# Patient Record
(demographics unavailable — no encounter records)

---

## 2017-05-31 NOTE — ED
Upper Extremity HPI





- General


Chief Complaint: Extremity Injury, Upper


Stated Complaint: Shoulder Pain/Arm Numb


Time Seen by Provider: 05/31/17 00:11


Source: patient, RN notes reviewed


Mode of arrival: wheelchair


Limitations: no limitations





- History of Present Illness


Initial Comments: 





53-year-old male presents to the emergency department with a chief complaint of 

bilateral shoulder pain and numbness and tingling down the left arm.  Patient 

has been having the symptoms for about a month ever since he had an injury at 

work.  Patient states that he went orthopedics and he is scheduled to have an 

MRI on Friday.  Patient states he just continued to has the discomfort he tried 

narcotic was at home with no improvement so he thought maybe we could help him.

  He is hoping that we can get in the arm so that doesn't hurt anymore.  

Patient denies any new falls or injuries.  Patient states that these symptoms 

are much like the symptoms he had when he fell and now actually is hand 

movement has improved since he seen or felt.  Patient states he was hoping that 

we can just take his pain away and that he could get some sleep.Patient denies 

any recent fever, chills, shortness of breath, chest pain, back pain, abdominal 

pain, nausea vomiting, numbness or tingling, dysuria or hematuria, constipation 

or diarrhea, headaches or visual changes, or any other current symptoms.


Place: home





- Related Data


 Home Medications











 Medication  Instructions  Recorded  Confirmed


 


Metoprolol Succinate (ER) [Toprol 100 mg PO DAILY 05/31/17 05/31/17





Xl]   


 


Pravastatin Sodium [Pravachol] 10 mg PO DAILY 05/31/17 05/31/17








 Previous Rx's











 Medication  Instructions  Recorded


 


Orphenadrine [Norflex] 100 mg PO Q12H #10 tablet.er 05/31/17


 


predniSONE 50 mg PO DAILY #5 tab 05/31/17











 Allergies











Allergy/AdvReac Type Severity Reaction Status Date / Time


 


No Known Allergies Allergy   Verified 05/31/17 00:03














Review of Systems


ROS Statement: 


Those systems with pertinent positive or pertinent negative responses have been 

documented in the HPI.





ROS Other: All systems not noted in ROS Statement are negative.





Past Medical History


Past Medical History: Hypertension


History of Any Multi-Drug Resistant Organisms: None Reported


Past Surgical History: Appendectomy, Orthopedic Surgery, Tonsillectomy


Past Psychological History: No Psychological Hx Reported


Smoking Status: Never smoker


Past Alcohol Use History: None Reported


Past Drug Use History: None Reported





General Exam


Limitations: no limitations


General appearance: alert, in no apparent distress


Head exam: Present: atraumatic, normocephalic, normal inspection


Eye exam: Present: normal appearance, PERRL, EOMI.  Absent: scleral icterus, 

conjunctival injection, periorbital swelling


Neck exam: Present: normal inspection.  Absent: tenderness, meningismus, 

lymphadenopathy


Respiratory exam: Present: normal lung sounds bilaterally.  Absent: respiratory 

distress, wheezes, rales, rhonchi, stridor


Cardiovascular Exam: Present: regular rate, normal rhythm, normal heart sounds.

  Absent: systolic murmur, diastolic murmur, rubs, gallop, clicks


Extremities exam: Present: normal inspection, full ROM, tenderness (Diffusely 

over bilateral shoulders), normal capillary refill, other (Patient does have 

some decreased strength to  in the left hand.)


Neurological exam: Present: alert, oriented X3


Psychiatric exam: Present: normal affect, normal mood


Skin exam: Present: warm, dry, intact, normal color.  Absent: rash





Course





 Vital Signs











  05/30/17





  23:57


 


Temperature 99.2 F


 


Pulse Rate 67


 


Respiratory 18





Rate 


 


Blood Pressure 127/89


 


O2 Sat by Pulse 93 L





Oximetry 














Medical Decision Making





- Medical Decision Making





52-year-old male presents emergency Department with a chief complaint of 

bilateral left shoulder pain which is chronic for the last month or so as well 

as numbness and tingling to the left arm.  This time we did set the patient on 

steroids muscle relaxers.  We discussed continuing to take the pain medication 

at home.  We discussed follow-up with orthopedic follow-up with MRI.  We 

discussed return parameters all patient's questions.  He stated he understood 

the plan.  At this time he will be discharged home.





Disposition


Clinical Impression: 


 Strain of knee, bilateral, Left cervical radiculopathy





Disposition: HOME SELF-CARE


Condition: Stable


Instructions:  Cervical Radiculopathy (ED)


Additional Instructions: 


Please use medication as discussed. Please follow up with family doctor if 

symptoms have not improved over the next two days. Please return to the 

emergency room if your symptoms increase or worsen or for any other concerns. 


Prescriptions: 


Orphenadrine [Norflex] 100 mg PO Q12H #10 tablet.er


predniSONE 50 mg PO DAILY #5 tab


Referrals: 


Kyle Rodriguez DO [Primary Care Provider] - 1-2 days


Time of Disposition: 00:47

## 2017-06-17 NOTE — ED
Extremity Problem HPI





- General


Source: patient


Mode of arrival: wheelchair


Limitations: no limitations





- History of Present Illness


MD Complaint: extremity pain


-: hour(s)


Location: left, upper extremity


History of Same: Yes


Quality: aching, constant


Consistency: constant


Improves with: other (Physician)


Worsens with: other (Position)


Associated Symptoms: denies other symptoms





<Francisco Flores - Last Filed: 06/17/17 06:43>





<Chiki Patel - Last Filed: 06/17/17 09:05>





- General


Chief complaint: Extremity Problem,Nontraumatic


Stated complaint: Shoulder/Neck Pain


Time Seen by Provider: 06/17/17 06:03





- History of Present Illness


Initial comments: 





This patient is a 53-year-old man who presents with complaint of pain to his 

left shoulder as well as weakness of the left arm.  The patient states that he 

has had approximately 8 weeks of left shoulder pain like he is having now.  

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  

The patient has been evaluated in the emergency department and also had follow-

up with orthopedics as well as having MRI.  He was told that he has an issue 

with the C6 disc.  Patient states that he slept "all over the place last night" 

and when he woke he was having weakness and aching of the left arm.  (Francisco Flores)





- Related Data


 Home Medications











 Medication  Instructions  Recorded  Confirmed


 


Metoprolol Succinate (ER) [Toprol 100 mg PO DAILY 05/31/17 05/31/17





Xl]   


 


Pravastatin Sodium [Pravachol] 10 mg PO DAILY 05/31/17 05/31/17








 Previous Rx's











 Medication  Instructions  Recorded


 


Orphenadrine [Norflex] 100 mg PO Q12H #10 tablet.er 05/31/17


 


predniSONE 50 mg PO DAILY #5 tab 05/31/17


 


Cyclobenzaprine [Flexeril] 10 mg PO TID PRN #20 tablet 06/17/17


 


Hydrocodone/Acetaminophen [Norco 2 each PO Q6HR PRN #20 tab 06/17/17





5-325]  


 


Ibuprofen [Motrin] 600 mg PO Q6HR PRN #20 tab 06/17/17











 Allergies











Allergy/AdvReac Type Severity Reaction Status Date / Time


 


Penicillins Allergy  Unknown Verified 06/17/17 05:51





   Childhood  














Review of Systems


ROS Other: All systems not noted in ROS Statement are negative.


Constitutional: Denies: fever, chills


Respiratory: Denies: cough, dyspnea


Cardiovascular: Denies: chest pain, palpitations, orthopnea, syncope


Gastrointestinal: Denies: abdominal pain, nausea, vomiting


Musculoskeletal: Reports: as per HPI, arthralgia


Skin: Denies: rash


Neurological: Reports: weakness.  Denies: headache, numbness, paresthesias





<MarkFrancisco - Last Filed: 06/17/17 06:43>


ROS Other: All systems not noted in ROS Statement are negative.





<Chiki Patel - Last Filed: 06/17/17 09:05>


ROS Statement: 


Those systems with pertinent positive or pertinent negative responses have been 

documented in the HPI.








Past Medical History


Past Medical History: Hypertension


History of Any Multi-Drug Resistant Organisms: None Reported


Past Surgical History: Appendectomy, Orthopedic Surgery, Tonsillectomy


Past Psychological History: No Psychological Hx Reported


Smoking Status: Never smoker


Past Alcohol Use History: None Reported


Past Drug Use History: None Reported





<MarkFrancisco - Last Filed: 06/17/17 06:43>





General Exam


Limitations: no limitations


General appearance: alert, in distress


Head exam: Present: atraumatic, normocephalic


Eye exam: Present: normal appearance.  Absent: scleral icterus, conjunctival 

injection


Respiratory exam: Present: normal lung sounds bilaterally.  Absent: respiratory 

distress, wheezes, rales, rhonchi, stridor


Cardiovascular Exam: Present: regular rate, normal rhythm, normal heart sounds.

  Absent: systolic murmur, diastolic murmur, rubs, gallop


GI/Abdominal exam: Present: soft.  Absent: distended, tenderness, guarding, 

rebound, mass


Extremities exam: Present: full ROM, tenderness, normal capillary refill.  

Absent: calf tenderness


Back exam: Present: normal inspection.  Absent: CVA tenderness (R), CVA 

tenderness (L)


Neurological exam: Present: alert, oriented X3, CN II-XII intact, motor sensory 

deficit (The patient has 4 out of 5 strength throughout the left upper 

extremity which is definitely weak versus a contralateral side.)


Skin exam: Present: warm, dry, intact, normal color.  Absent: rash





<Francisco Flores - Last Filed: 06/17/17 06:43>





Medical Decision Making





- EKG Data


-: EKG Interpreted by Me


EKG shows normal: sinus rhythm, axis (Normal), intervals (QTc is 486 ms which 

is prolonged.), QRS complexes (Normal)


Rate: normal (Rate approximately 73 bpm)


Interpretation: nonspecific ST-T wave changes





<Francisco Flores - Last Filed: 06/17/17 06:43>





- Lab Data


Result diagrams: 


 06/17/17 06:30





 06/17/17 06:30





- Radiology Data


Radiology results: report reviewed (Computed tomography scan of the brain shows 

no acute process)





<Chiki Patel - Last Filed: 06/17/17 09:05>





- Medical Decision Making





Patient reevaluated by myself, Dr. Patel.  Patient states fall occurred 10 

weeks ago and did fall around 10 feet.  Patient states weakness of the left 

 is chronic since a fall however slightly worse.  Patient states it is not 

significantly worse over the past several days.  Patient states his discomfort 

has increased significantly over the past couple of days.  Patient still feels 

uncomfortable despite pain medication.  Patient did follow-up with Dr. Lei 

and had MRI done around 10 days ago stating there was a C6 degenerative problem.





Patient reexamined again at 8:55 AM.  Patient is starting to feel somewhat 

better and comfortable and requests discharge home.  Patient would like further 

pain medication prior to discharge.  Patient is advised to follow-up with 

specialist or neurosurgeon for further evaluation.  Patient has been on 

steroids twice for this and therefore this will be avoided this time. (Chiki Patel)





- Lab Data


 Lab Results











  06/17/17 06/17/17 06/17/17 Range/Units





  06:30 06:30 06:30 


 


WBC  3.5 L    (3.8-10.6)  k/uL


 


RBC  4.54    (4.30-5.90)  m/uL


 


Hgb  14.5    (13.0-17.5)  gm/dL


 


Hct  43.2    (39.0-53.0)  %


 


MCV  95.0    (80.0-100.0)  fL


 


MCH  32.0    (25.0-35.0)  pg


 


MCHC  33.7    (31.0-37.0)  g/dL


 


RDW  14.4    (11.5-15.5)  %


 


Plt Count  91 L    (150-450)  k/uL


 


Neutrophils %  51    %


 


Lymphocytes %  36    %


 


Monocytes %  7    %


 


Eosinophils %  1    %


 


Basophils %  1    %


 


Neutrophils #  1.8    (1.3-7.7)  k/uL


 


Lymphocytes #  1.3    (1.0-4.8)  k/uL


 


Monocytes #  0.3    (0-1.0)  k/uL


 


Eosinophils #  0.0    (0-0.7)  k/uL


 


Basophils #  0.0    (0-0.2)  k/uL


 


Manual Slide Review  Performed    


 


Sodium   143   (137-145)  mmol/L


 


Potassium   3.3 L   (3.5-5.1)  mmol/L


 


Chloride   100   ()  mmol/L


 


Carbon Dioxide   28   (22-30)  mmol/L


 


Anion Gap   15   mmol/L


 


BUN   13   (9-20)  mg/dL


 


Creatinine   0.58 L   (0.66-1.25)  mg/dL


 


Est GFR (MDRD) Af Amer   >60   (>60 ml/min/1.73 sqM)  


 


Est GFR (MDRD) Non-Af   >60   (>60 ml/min/1.73 sqM)  


 


Glucose   86   (74-99)  mg/dL


 


Calcium   8.5   (8.4-10.2)  mg/dL


 


Magnesium     (1.6-2.3)  mg/dL


 


Total Bilirubin   1.0   (0.2-1.3)  mg/dL


 


AST   169 H   (17-59)  U/L


 


ALT   107 H   (21-72)  U/L


 


Alkaline Phosphatase   69   ()  U/L


 


Troponin I    <0.012  (0.000-0.034)  ng/mL


 


Total Protein   6.8   (6.3-8.2)  g/dL


 


Albumin   4.0   (3.5-5.0)  g/dL














  06/17/17 Range/Units





  06:30 


 


WBC   (3.8-10.6)  k/uL


 


RBC   (4.30-5.90)  m/uL


 


Hgb   (13.0-17.5)  gm/dL


 


Hct   (39.0-53.0)  %


 


MCV   (80.0-100.0)  fL


 


MCH   (25.0-35.0)  pg


 


MCHC   (31.0-37.0)  g/dL


 


RDW   (11.5-15.5)  %


 


Plt Count   (150-450)  k/uL


 


Neutrophils %   %


 


Lymphocytes %   %


 


Monocytes %   %


 


Eosinophils %   %


 


Basophils %   %


 


Neutrophils #   (1.3-7.7)  k/uL


 


Lymphocytes #   (1.0-4.8)  k/uL


 


Monocytes #   (0-1.0)  k/uL


 


Eosinophils #   (0-0.7)  k/uL


 


Basophils #   (0-0.2)  k/uL


 


Manual Slide Review   


 


Sodium   (137-145)  mmol/L


 


Potassium   (3.5-5.1)  mmol/L


 


Chloride   ()  mmol/L


 


Carbon Dioxide   (22-30)  mmol/L


 


Anion Gap   mmol/L


 


BUN   (9-20)  mg/dL


 


Creatinine   (0.66-1.25)  mg/dL


 


Est GFR (MDRD) Af Amer   (>60 ml/min/1.73 sqM)  


 


Est GFR (MDRD) Non-Af   (>60 ml/min/1.73 sqM)  


 


Glucose   (74-99)  mg/dL


 


Calcium   (8.4-10.2)  mg/dL


 


Magnesium  1.8  (1.6-2.3)  mg/dL


 


Total Bilirubin   (0.2-1.3)  mg/dL


 


AST   (17-59)  U/L


 


ALT   (21-72)  U/L


 


Alkaline Phosphatase   ()  U/L


 


Troponin I   (0.000-0.034)  ng/mL


 


Total Protein   (6.3-8.2)  g/dL


 


Albumin   (3.5-5.0)  g/dL














Disposition





<Francisco Flores - Last Filed: 06/17/17 06:43>


Time of Disposition: 09:05





<Chiki Patel - Last Filed: 06/17/17 09:05>


Clinical Impression: 


 Cervical radiculopathy





Disposition: HOME SELF-CARE


Condition: Stable


Instructions:  Cervical Radiculopathy (ED)


Additional Instructions: 


Please follow-up with Dr. Lindo or neurosurgeon this coming week.  Return for 

increased weakness, uncontrolled pain, fever, change or worsening symptoms or 

other concerns.


Prescriptions: 


Cyclobenzaprine [Flexeril] 10 mg PO TID PRN #20 tablet


 PRN Reason: Pain


Hydrocodone/Acetaminophen [Norco 5-325] 2 each PO Q6HR PRN #20 tab


 PRN Reason: Pain


Ibuprofen [Motrin] 600 mg PO Q6HR PRN #20 tab


 PRN Reason: Pain


Referrals: 


None,Stated [Primary Care Provider] - 1-2 days


FERNANDO Lindo DO [Doctor of Osteopathic Medicine] - 1-2 days


Lencho,Anil, MD [STAFF PHYSICIAN] - 1-2 days


Tianna Diamond MD [REFERRING] - 1-2 days

## 2017-06-17 NOTE — CT
EXAM:

  CT Head Without Intravenous Contrast

 

CLINICAL HISTORY: weakness

 

TECHNIQUE:

  Axial computed tomography images of the head/brain without intravenous 

contrast.  CTDI is 60.3 mGy and DLP is 1180.9 mGy-cm.  This CT exam was 

performed using one or more of the following dose reduction techniques: 

automated exposure control, adjustment of the mA and/or kV according to 

patient size, and/or use of iterative reconstruction technique.  Coronal 

and sagittal reconstructions are performed

 

COMPARISON:

  No relevant prior studies available.

 

FINDINGS:

  Brain:  Unremarkable.  No hemorrhage.  No significant white matter 

disease.  No edema.

  Ventricles:  Unremarkable.  No ventriculomegaly.

  Bones/joints:  Unremarkable.  No acute fracture.

  Soft tissues:  Unremarkable.

  Sinuses:  Minimal air fluid level in left sphenoid sinus.

  Mastoid air cells:  Unremarkable as visualized.  No mastoid effusion.

 

IMPRESSION:     

  No acute intracranial findings.

## 2018-05-21 NOTE — US
EXAMINATION TYPE: US abdomen limited

 

DATE OF EXAM: 5/21/2018

 

COMPARISON: NONE

 

CLINICAL HISTORY: 54-year-old male R17 Unspecified Jaundice. Elevated total bilirubin

 

TECHNIQUE: Multiple sonographic images of the right upper quadrant are obtained.

 

FINDINGS:

 

EXAM MEASUREMENTS:

 

Liver Length:  14.2 cm   

Gallbladder Wall:  0.3 cm   

CBD:  0.3 cm

Right Kidney:  11.6 x 5.5 x 4.9 cm

 

Sonographer notes:**Technical limitations due to large amount of overlying bowel content 

 

Pancreas:  Tail obscured by overlying bowel gas. Visualized portions show no gross abnormal body.

Liver: Limited intercostal views show echogenic appearance. No focal lesion in the visualized portion
s.

Gallbladder:  No abnormal distention, pericholecystic fluid, or shadowing calculi. Wall thickness is 
upper limits of normal at 3 mm.

**Evidence for sonographic Villarreal's sign:  no

CBD:  appears wnl as visualized  

Right Kidney:  no evidence of hydronephrosis 

 

 

 

IMPRESSION: 

1. No cholelithiasis or biliary ductal dilatation.

2. Technically limited exam with intercostal imaging of the liver showing echogenic appearance. Findi
ngs suggest hepatic steatosis.